# Patient Record
Sex: MALE | Race: WHITE | URBAN - METROPOLITAN AREA
[De-identification: names, ages, dates, MRNs, and addresses within clinical notes are randomized per-mention and may not be internally consistent; named-entity substitution may affect disease eponyms.]

---

## 2023-02-06 ENCOUNTER — OFFICE VISIT (OUTPATIENT)
Dept: URBAN - METROPOLITAN AREA CLINIC 71 | Facility: CLINIC | Age: 43
End: 2023-02-06
Payer: COMMERCIAL

## 2023-02-06 DIAGNOSIS — H52.13 MYOPIA, BILATERAL: ICD-10-CM

## 2023-02-06 DIAGNOSIS — H04.123 DRY EYE SYNDROME OF BILATERAL LACRIMAL GLANDS: Primary | ICD-10-CM

## 2023-02-06 PROCEDURE — 99204 OFFICE O/P NEW MOD 45 MIN: CPT | Performed by: OPTOMETRIST

## 2023-02-06 PROCEDURE — 92310 CONTACT LENS FITTING OU: CPT | Performed by: OPTOMETRIST

## 2023-02-06 ASSESSMENT — INTRAOCULAR PRESSURE
OS: 11
OD: 14

## 2023-02-06 ASSESSMENT — KERATOMETRY
OS: 43.63
OD: 42.75

## 2023-02-06 ASSESSMENT — VISUAL ACUITY
OD: 20/20
OS: 20/20

## 2023-02-06 NOTE — IMPRESSION/PLAN
Impression: Myopia, bilateral: H52.13. uncorrected astigmatism with CLs. Pt declined dilation. Plan: A glasses prescription has been discussed and generated. -Power change in SCLs. Pt also wants to try different brand. Obtain contact lens trials of both brands (old and new). A contact lens follow-up is needed to evaluate the NEW lenses and change trials or finalize the contact lens prescription. Discussed benefit of toric lenses. Pt desires to stay in spherical for now since mainly uses them for sporting occasions. Will charge addtl fit if switch to toric dailies.  Explained importance of DE.

## 2023-02-06 NOTE — IMPRESSION/PLAN
Impression: Dry eye syndrome of bilateral lacrimal glands: H04.123. Plan: YVETTE. Discussed dry eye disease. Use artificial tears up to QID. Call if worsens or no improvement.